# Patient Record
Sex: FEMALE | Race: BLACK OR AFRICAN AMERICAN | Employment: UNEMPLOYED | URBAN - METROPOLITAN AREA
[De-identification: names, ages, dates, MRNs, and addresses within clinical notes are randomized per-mention and may not be internally consistent; named-entity substitution may affect disease eponyms.]

---

## 2017-01-05 NOTE — PROGRESS NOTES
SUBJECTIVE:                                                    Waleska Geronimo is a 36 year old female who presents to clinic today for the following health issues:    Due to language barrier, I've requested an  today via phone    It appears she didn't have an  when she was given the results of her labs. Therefore she was unclear of how or where to schedule a specialist appointment with endocrinology.     Presents today because she would like to be put on birth control. LMP: 1/5/17     Would like to start birth control  No protection currently  Last unprotected intercourse was 4 days ago  Has previously been on the depo shot, would like to continue this.   LMP: 1/5/17  Cycles are 27-28 days  Menstruation is 3-5 days  Last 2 periods have been normal.       She also has a history of depression and was on zoloft but prefers not to be on this and also that it not be on her record.     Problem list and histories reviewed & adjusted, as indicated.  Additional history: as documented    ROS:  C: NEGATIVE for fever, chills, change in weight  E/M: NEGATIVE for ear, mouth and throat problems  R: NEGATIVE for significant cough or SOB  CV: NEGATIVE for chest pain, palpitations or peripheral edema    Patient Active Problem List   Diagnosis     Hyperthyroidism     Past Surgical History   Procedure Laterality Date     No history of surgery         Social History   Substance Use Topics     Smoking status: Never Smoker      Smokeless tobacco: Not on file     Alcohol Use: No     Family History   Problem Relation Age of Onset     Coronary Artery Disease No family hx of      DIABETES No family hx of      CEREBROVASCULAR DISEASE No family hx of      Breast Cancer No family hx of      Colon Cancer No family hx of            Problem list, Medication list, Allergies, and Medical/Social/Surgical histories reviewed in [facility] and updated as appropriate.  Labs reviewed in EPIC    OBJECTIVE:                                   "                  /75 mmHg  Pulse 105  Temp(Src) 99.2  F (37.3  C) (Oral)  Ht 5' 2\" (1.575 m)  Wt 131 lb 9.6 oz (59.693 kg)  BMI 24.06 kg/m2  SpO2 98%  LMP 01/05/2017 Body mass index is 24.06 kg/(m^2).   GENERAL: healthy, alert, well nourished, well hydrated, no distress  EYES: Eyes grossly normal to inspection, extraocular movements - intact, and PERRL  HENT: ear canals- normal; TMs- normal; Nose- normal; Mouth- no ulcers, no lesions  NECK: no tenderness, no adenopathy, no asymmetry, no masses, no stiffness; thyroid- normal to palpation  RESP: lungs clear to auscultation - no rales, no rhonchi, no wheezes  CV: regular rates and rhythm, normal S1 S2, no S3 or S4 and no murmur, no click or rub -  SKIN: no suspicious lesions, no rashes         ASSESSMENT/PLAN:                                                        ICD-10-CM    1. Encounter for initial prescription of injectable contraceptive Z30.013 Beta HCG qual IFA urine     Beta HCG qual IFA urine   2. Hyperthyroidism E05.90      > 25 minutes were spent with the patient and / or family present in education and / or counseling regarding the issues below, including coordination of care.  This represented more than 50% of the time spent interacting with the patient during this visit.     Patient Instructions   Get pregnancy test today  Use condoms for protection and return in 2 weeks for repeat pregnancy test  If this is negative, ok to start Depo shot.      PLEASE CALL TO SCHEDULE AN APPOINTMENT FOR FOLLOW UP ON YOUR THYROID. You can choose any clinic that is convenient for you and is covered by your health insurance.  Your provider has referred you to: [facility]: Endocrinology and Diabetes Clinic - [place] [phone]   [email or web address]  [facility]: [facility] [phone]   [email or web address]  [facility]: Endocrinology Clinic " "[facility] [place] [phone]   [email or web address]  [facility] for Endocrine and Metabolic [facility]  [place] [phone]      Please be aware that coverage of these services is subject to the terms and limitations of your health insurance plan.  Call member services at your health plan with any benefit or coverage questions.      Please bring the following to your appointment:    >>   Any x-rays, CTs or MRIs which have been performed.  Contact the facility where they were done to arrange for  prior to your scheduled appointment.    >>   List of current medications   >>   This referral request   >>   Any documents/labs given to you for this referral          Estimated body mass index is 24.06 kg/(m^2) as calculated from the following:    Height as of this encounter: 5' 2\" (1.575 m).    Weight as of this encounter: 131 lb 9.6 oz (59.693 kg).       Adali Whitley [facility]  [facility]      "

## 2017-01-09 ENCOUNTER — OFFICE VISIT (OUTPATIENT)
Dept: FAMILY MEDICINE | Facility: CLINIC | Age: 37
End: 2017-01-09
Payer: COMMERCIAL

## 2017-01-09 VITALS
HEIGHT: 62 IN | BODY MASS INDEX: 24.22 KG/M2 | TEMPERATURE: 99.2 F | OXYGEN SATURATION: 98 % | SYSTOLIC BLOOD PRESSURE: 113 MMHG | DIASTOLIC BLOOD PRESSURE: 75 MMHG | HEART RATE: 105 BPM | WEIGHT: 131.6 LBS

## 2017-01-09 DIAGNOSIS — E05.90 HYPERTHYROIDISM: ICD-10-CM

## 2017-01-09 DIAGNOSIS — Z30.013 ENCOUNTER FOR INITIAL PRESCRIPTION OF INJECTABLE CONTRACEPTIVE: Primary | ICD-10-CM

## 2017-01-09 LAB — BETA HCG QUAL IFA URINE: NEGATIVE

## 2017-01-09 PROCEDURE — 99214 OFFICE O/P EST MOD 30 MIN: CPT | Performed by: PHYSICIAN ASSISTANT

## 2017-01-09 PROCEDURE — 84703 CHORIONIC GONADOTROPIN ASSAY: CPT | Performed by: PHYSICIAN ASSISTANT

## 2017-01-09 NOTE — MR AVS SNAPSHOT
After Visit Summary   1/9/2017    Waleska Geronimo    MRN: [id number]           Patient Information     Date Of Birth          1980        Visit Information        Provider Department      1/9/2017 2:20 PM Adali Costello PA-C [facility]        Today's Diagnoses     Encounter for initial prescription of injectable contraceptive    -  1     Hyperthyroidism           Care Instructions    Get pregnancy test today  Use condoms for protection and return in 2 weeks for repeat pregnancy test  If this is negative, ok to start Depo shot.      PLEASE CALL TO SCHEDULE AN APPOINTMENT FOR FOLLOW UP ON YOUR THYROID. You can choose any clinic that is convenient for you and is covered by your health insurance.  Your provider has referred you to: [facility]: Endocrinology and Diabetes Clinic [facility] [phone]   [email or web address]/  [facility]: [facility] [phone]   [email or web address]://[email or web address]/[email or web address]/  [facility]: Endocrinology Clinic [facility] [phone]   [email or web address]  [facility] for Endocrine and Metabolic Disorders [facility] [phone]      Please be aware that coverage of these services is subject to the terms and limitations of your health insurance plan.  Call member services at your health plan with any benefit or coverage questions.      Please bring the following to your appointment:    >>   Any x-rays, CTs or MRIs which have been performed.  Contact the facility where they were done to arrange for  prior to your scheduled appointment.    >>   List of current medications   >>   This referral request   >>   Any documents/labs given to you for this referral          Follow-ups after your visit        Future tests that were ordered for you today     Open Future Orders        Priority Expected Expires Ordered    Beta HCG qual  "IFA urine Routine 2017            Who to contact     If you have questions or need follow up information about today's clinic visit or your schedule please contact [facility] directly at [phone].  Normal or non-critical lab and imaging results will be communicated to you by MyChart, letter or phone within 4 business days after the clinic has received the results. If you do not hear from us within 7 days, please contact the clinic through [employer] or phone. If you have a critical or abnormal lab result, we will notify you by phone as soon as possible.  Submit refill requests through "[employer] or call your pharmacy and they will forward the refill request to us. Please allow 3 business days for your refill to be completed.          Additional Information About Your Visit        Camio[employer]utor Assignment Information     "[employer] lets you send messages to your doctor, view your test results, renew your prescriptions, schedule appointments and more. To sign up, go to [email or web address] . Click on \"Log in\" on the left side of the screen, which will take you to the Welcome page. Then click on \"Sign up Now\" on the right side of the page.     You will be asked to enter the access code listed below, as well as some personal information. Please follow the directions to create your username and password.     Your access code is: [email or web address]  Expires: 2017  9:35 AM     Your access code will  in 90 days. If you need help or a new code, please call your [facility] or [phone].        Care EveryWhere ID     This is your Care EveryWhere ID. This could be used by other organizations to access your [place] medical records  [id number]        Your Vitals Were     Pulse Temperature Height BMI (Body Mass Index) Pulse Oximetry Last Period    105 99.2  F (37.3  C) (Oral) 5' 2\" (1.575 m) 24.06 kg/m2 98% 2017       Blood Pressure from Last 3 Encounters:   17 113/75   10/20/16 " 110/75    Weight from Last 3 Encounters:   01/09/17 131 lb 9.6 oz (59.693 kg)   10/20/16 130 lb 1.6 oz (59.013 kg)              We Performed the Following     Beta HCG qual IFA urine        Primary Care Provider    None Specified       No primary provider on file.        Thank you!     Thank you for choosing [facility]  for your care. Our goal is always to provide you with excellent care. Hearing back from our patients is one way we can continue to improve our services. Please take a few minutes to complete the written survey that you may receive in the mail after your visit with us. Thank you!             Your Updated Medication List - Protect others around you: Learn how to safely use, store and throw away your medicines at www.disposemymeds.org.      Notice  As of 1/9/2017  2:43 PM    You have not been prescribed any medications.

## 2017-01-09 NOTE — NURSING NOTE
"Chief Complaint   Patient presents with     Contraception       Initial /75 mmHg  Pulse 105  Temp(Src) 99.2  F (37.3  C) (Oral)  Ht 5' 2\" (1.575 m)  Wt 131 lb 9.6 oz (59.693 kg)  BMI 24.06 kg/m2  SpO2 98%  LMP 01/05/2017 Estimated body mass index is 24.06 kg/(m^2) as calculated from the following:    Height as of this encounter: 5' 2\" (1.575 m).    Weight as of this encounter: 131 lb 9.6 oz (59.693 kg).  BP completed using cuff size: regular    Danica Connor MA      "

## 2017-01-09 NOTE — PATIENT INSTRUCTIONS
Get pregnancy test today  Use condoms for protection and return in 2 weeks for repeat pregnancy test  If this is negative, ok to start Depo shot.      PLEASE CALL TO SCHEDULE AN APPOINTMENT FOR FOLLOW UP ON YOUR THYROID. You can choose any clinic that is convenient for you and is covered by your health insurance.  Your provider has referred you to: [facility]: Endocrinology and Diabetes Clinic [facility] [phone]   [email or web address]/  [facility]: [facility] [place] [place] [phone]   [email or web address]/  [facility]: Endocrinology Clinic [facility] [phone]   [email or web address]  [facility] for Endocrine and Metabolic Disorders [facility] [phone]      Please be aware that coverage of these services is subject to the terms and limitations of your health insurance plan.  Call member services at your health plan with any benefit or coverage questions.      Please bring the following to your appointment:    >>   Any x-rays, CTs or MRIs which have been performed.  Contact the facility where they were done to arrange for  prior to your scheduled appointment.    >>   List of current medications   >>   This referral request   >>   Any documents/labs given to you for this referral

## 2017-01-24 ENCOUNTER — ALLIED HEALTH/NURSE VISIT (OUTPATIENT)
Dept: NURSING | Facility: CLINIC | Age: 37
End: 2017-01-24
Payer: COMMERCIAL

## 2017-01-24 DIAGNOSIS — Z30.013 ENCOUNTER FOR INITIAL PRESCRIPTION OF INJECTABLE CONTRACEPTIVE: Primary | ICD-10-CM

## 2017-01-24 LAB — BETA HCG QUAL IFA URINE: NEGATIVE

## 2017-01-24 PROCEDURE — 96372 THER/PROPH/DIAG INJ SC/IM: CPT

## 2017-01-24 PROCEDURE — 84703 CHORIONIC GONADOTROPIN ASSAY: CPT | Performed by: PHYSICIAN ASSISTANT

## 2017-01-24 PROCEDURE — 99207 ZZC NO CHARGE NURSE ONLY: CPT

## 2017-01-24 RX ORDER — MEDROXYPROGESTERONE ACETATE 150 MG/ML
150 INJECTION, SUSPENSION INTRAMUSCULAR
Qty: 1 ML | Refills: 3 | OUTPATIENT
Start: 2017-01-24 | End: 2019-07-30

## 2017-02-03 ENCOUNTER — TELEPHONE (OUTPATIENT)
Dept: FAMILY MEDICINE | Facility: CLINIC | Age: 37
End: 2017-02-03

## 2017-02-03 NOTE — Clinical Note
[facility]  [address] [place] [address]  [facility] [address]  [phone]      February 3, 2017      Waleska Geronimo  [address]  [facility] [address]          Dear Waleska    In order to ensure we are providing the best quality care, we have reviewed your chart and see that you are due for:    1. Pap Smear    Please call the clinic at your earliest convenience to schedule an appointment.  If you have completed these please contact our office via phone at [phone] or [employer] to update our records.  We would like to know the date (approximately month and year), the result, and ideally where the procedure was performed.    Thank you for trusting us with your health care.      Sincerely,    Care Team for Adali Costello PA-C

## 2017-02-03 NOTE — TELEPHONE ENCOUNTER
Panel Management Review      Patient has the following on her problem list: None      Composite cancer screening  Chart review shows that this patient is due/due soon for the following Pap Smear  Summary:    Patient is due/failing the following:   PAP    Action needed:   Patient needs office visit for Pap smear.    Type of outreach:    Sent letter.    Questions for provider review:    None                                                                                                                                    Danica Connor MA       Chart routed to none.

## 2019-07-30 ENCOUNTER — HOSPITAL ENCOUNTER (EMERGENCY)
Facility: CLINIC | Age: 39
Discharge: HOME | End: 2019-07-30
Attending: EMERGENCY MEDICINE | Admitting: EMERGENCY MEDICINE
Payer: COMMERCIAL

## 2019-07-30 VITALS
SYSTOLIC BLOOD PRESSURE: 110 MMHG | BODY MASS INDEX: 25.64 KG/M2 | OXYGEN SATURATION: 100 % | TEMPERATURE: 98.1 F | DIASTOLIC BLOOD PRESSURE: 74 MMHG | WEIGHT: 140.2 LBS | HEART RATE: 70 BPM

## 2019-07-30 DIAGNOSIS — G44.209 TENSION HEADACHE: ICD-10-CM

## 2019-07-30 LAB
ALBUMIN UR-MCNC: 10 MG/DL
APPEARANCE UR: ABNORMAL
BACTERIA #/AREA URNS HPF: ABNORMAL /HPF
BILIRUB UR QL STRIP: NEGATIVE
COLOR UR AUTO: YELLOW
GLUCOSE UR STRIP-MCNC: NEGATIVE MG/DL
HCG UR QL: NEGATIVE
HGB UR QL STRIP: NEGATIVE
INTERNAL QC OK POCT: YES
KETONES UR STRIP-MCNC: NEGATIVE MG/DL
LEUKOCYTE ESTERASE UR QL STRIP: ABNORMAL
MUCOUS THREADS #/AREA URNS LPF: PRESENT /LPF
NITRATE UR QL: NEGATIVE
PH UR STRIP: 7.5 PH (ref 5–7)
RBC #/AREA URNS AUTO: 3 /HPF (ref 0–2)
SOURCE: ABNORMAL
SP GR UR STRIP: 1.01 (ref 1–1.03)
SQUAMOUS #/AREA URNS AUTO: 8 /HPF (ref 0–1)
UROBILINOGEN UR STRIP-MCNC: NORMAL MG/DL (ref 0–2)
WBC #/AREA URNS AUTO: 2 /HPF (ref 0–5)

## 2019-07-30 PROCEDURE — 25000128 H RX IP 250 OP 636: Performed by: EMERGENCY MEDICINE

## 2019-07-30 PROCEDURE — 99284 EMERGENCY DEPT VISIT MOD MDM: CPT | Mod: 25

## 2019-07-30 PROCEDURE — 96374 THER/PROPH/DIAG INJ IV PUSH: CPT

## 2019-07-30 PROCEDURE — 99284 EMERGENCY DEPT VISIT MOD MDM: CPT | Mod: Z6 | Performed by: EMERGENCY MEDICINE

## 2019-07-30 PROCEDURE — 81001 URINALYSIS AUTO W/SCOPE: CPT | Performed by: EMERGENCY MEDICINE

## 2019-07-30 PROCEDURE — 81025 URINE PREGNANCY TEST: CPT | Performed by: EMERGENCY MEDICINE

## 2019-07-30 PROCEDURE — 96361 HYDRATE IV INFUSION ADD-ON: CPT

## 2019-07-30 PROCEDURE — 96375 TX/PRO/DX INJ NEW DRUG ADDON: CPT

## 2019-07-30 RX ORDER — DIPHENHYDRAMINE HYDROCHLORIDE 50 MG/ML
25 INJECTION INTRAMUSCULAR; INTRAVENOUS ONCE
Status: COMPLETED | OUTPATIENT
Start: 2019-07-30 | End: 2019-07-30

## 2019-07-30 RX ORDER — KETOROLAC TROMETHAMINE 30 MG/ML
30 INJECTION, SOLUTION INTRAMUSCULAR; INTRAVENOUS ONCE
Status: COMPLETED | OUTPATIENT
Start: 2019-07-30 | End: 2019-07-30

## 2019-07-30 RX ORDER — SODIUM CHLORIDE 9 MG/ML
1000 INJECTION, SOLUTION INTRAVENOUS CONTINUOUS
Status: DISCONTINUED | OUTPATIENT
Start: 2019-07-30 | End: 2019-07-30 | Stop reason: HOSPADM

## 2019-07-30 RX ORDER — ONDANSETRON 2 MG/ML
4 INJECTION INTRAMUSCULAR; INTRAVENOUS EVERY 30 MIN PRN
Status: DISCONTINUED | OUTPATIENT
Start: 2019-07-30 | End: 2019-07-30 | Stop reason: HOSPADM

## 2019-07-30 RX ADMIN — ONDANSETRON 4 MG: 2 INJECTION INTRAMUSCULAR; INTRAVENOUS at 12:41

## 2019-07-30 RX ADMIN — DIPHENHYDRAMINE HYDROCHLORIDE 25 MG: 50 INJECTION, SOLUTION INTRAMUSCULAR; INTRAVENOUS at 12:41

## 2019-07-30 RX ADMIN — KETOROLAC TROMETHAMINE 30 MG: 30 INJECTION, SOLUTION INTRAMUSCULAR at 12:42

## 2019-07-30 RX ADMIN — SODIUM CHLORIDE 1000 ML: 9 INJECTION, SOLUTION INTRAVENOUS at 12:40

## 2019-07-30 ASSESSMENT — ENCOUNTER SYMPTOMS
FEVER: 0
NAUSEA: 1
HEADACHES: 1
VOMITING: 1

## 2019-07-30 NOTE — ED PROVIDER NOTES
[facility] EMERGENCY DEPARTMENT ([facility])     July 30, 2019    History     Chief Complaint   Patient presents with     Headache     headache started last night, weak, vomiting     HPI  Waleska Geronimo is a 38 year old female with a history of hyperthyroidsim who presents to the ED for evaluation of a headache that started last night. Patient reports her headache is mostly located on the top of her head. She complains of nausea and vomiting this morning after breakfast. She took an unspecified medication for her headache prior to arrival. She denies vision changes or fever. Patient reports she has no problems ambulating independently. Of note, she has had previous episodes of headaches, but not as severe as this incident. No fever; no neck pain.     PAST MEDICAL HISTORY  Past Medical History:   Diagnosis Date     Enlarged thyroid      PAST SURGICAL HISTORY  Past Surgical History:   Procedure Laterality Date     NO HISTORY OF SURGERY       FAMILY HISTORY  Family History   Problem Relation Age of Onset     Coronary Artery Disease No family hx of      Diabetes No family hx of      Cerebrovascular Disease No family hx of      Breast Cancer No family hx of      Colon Cancer No family hx of      SOCIAL HISTORY  Social History     Tobacco Use     Smoking status: Never Smoker     Smokeless tobacco: Never Used   Substance Use Topics     Alcohol use: No     MEDICATIONS  No current facility-administered medications for this encounter.      No current outpatient medications on file.     ALLERGIES  No Known Allergies      I have reviewed the Medications, Allergies, Past Medical and Surgical History, and Social History in the Epic system.    Review of Systems   Constitutional: Negative for fever.   Eyes: Negative for visual disturbance.   Gastrointestinal: Positive for nausea and vomiting.   Neurological: Positive for headaches.   All other systems reviewed and are negative.      Physical Exam   BP: 134/77  Heart Rate:  65  Temp: 96  F (35.6  C)  Weight: 63.6 kg (140 lb 3.2 oz)  SpO2: 100 %      Physical Exam   Constitutional: She is oriented to person, place, and time. She appears well-developed and well-nourished.   HENT:   Head: Normocephalic and atraumatic.   Eyes: Pupils are equal, round, and reactive to light. Conjunctivae and EOM are normal.   Neck: Normal range of motion.   Cardiovascular: Normal rate, regular rhythm and normal heart sounds.   No murmur heard.  Pulmonary/Chest: Effort normal and breath sounds normal. No stridor. No respiratory distress. She has no wheezes.   Abdominal: Soft. She exhibits no distension. There is no tenderness. There is no rebound.   Musculoskeletal: She exhibits no tenderness.   Neurological: She is alert and oriented to person, place, and time.   Skin: Skin is warm and dry.   Psychiatric: She has a normal mood and affect. Her behavior is normal. Thought content normal.       ED Course        Procedures             Critical Care time:  none         Results for orders placed or performed during the hospital encounter of 07/30/19   UA with Microscopic   Result Value Ref Range    Color Urine Yellow     Appearance Urine Slightly Cloudy     Glucose Urine Negative NEG^Negative mg/dL    Bilirubin Urine Negative NEG^Negative    Ketones Urine Negative NEG^Negative mg/dL    Specific Gravity Urine 1.015 1.003 - 1.035    Blood Urine Negative NEG^Negative    pH Urine 7.5 (H) 5.0 - 7.0 pH    Protein Albumin Urine 10 (A) NEG^Negative mg/dL    Urobilinogen mg/dL Normal 0.0 - 2.0 mg/dL    Nitrite Urine Negative NEG^Negative    Leukocyte Esterase Urine Moderate (A) NEG^Negative    Source Midstream Urine     WBC Urine 2 0 - 5 /HPF    RBC Urine 3 (H) 0 - 2 /HPF    Bacteria Urine Few (A) NEG^Negative /HPF    Squamous Epithelial /HPF Urine 8 (H) 0 - 1 /HPF    Mucous Urine Present (A) NEG^Negative /LPF   hCG qual urine POCT   Result Value Ref Range    HCG Qual Urine Negative neg    Internal QC OK Yes       Medications   0.9% sodium chloride BOLUS (0 mLs Intravenous Stopped 7/30/19 1432)   ketorolac (TORADOL) injection 30 mg (30 mg Intravenous Given 7/30/19 1242)   diphenhydrAMINE (BENADRYL) injection 25 mg (25 mg Intravenous Given 7/30/19 1241)            Labs Ordered and Resulted from Time of ED Arrival Up to the Time of Departure from the ED - No data to display      No results found for this or any previous visit (from the past 24 hour(s)).      Assessments & Plan (with Medical Decision Making)  Patient is a 38-year-old female with a history of prior headaches who presents to the ER due to headache about 12 hours.  Patient states the headache started last night and was persistent today.  Patient here received some IV fluids, nausea medication, and some Toradol and is feeling better.  Her headache is resolved.  Family says that she feels back to normal.  Patient will be given oral challenge, as long as she is able to keep it down will be discharged home with her family members.   This part of the medical record was transcribed by Yari Pereyra,  Medical Scribe, from a dictation done by Robyn Jesus MD.        I have reviewed the nursing notes.    I have reviewed the findings, diagnosis, plan and need for follow up with the patient.       Medication List      There are no discharge medications for this visit.         Final diagnoses:   Tension headache     Yari CASTRO, am serving as a trained medical scribe to document services personally performed by Robyn Jesus MD, based on the provider's statements to me.      Robyn CASTRO MD, was physically present and have reviewed and verified the accuracy of this note documented by Yari Pereyra.     7/30/2019   [facility], EMERGENCY DEPARTMENT     Robyn Jesus MD  07/30/19 4730

## 2019-07-30 NOTE — ED AVS SNAPSHOT
[facility], [place], Emergency Department  [address]  [place] [place] [place] [facility] [place] [place] [address]  Phone:  [phone]  Fax:  [phone]                                    Waleska Geronimo   MRN: [id number]    Department:  [facility], Emergency Department   Date of Visit:  7/30/2019           After Visit Summary Signature Page    I have received my discharge instructions, and my questions have been answered. I have discussed any challenges I see with this plan with the nurse or doctor.    ..........................................................................................................................................  Patient/Patient Representative Signature      ..........................................................................................................................................  Patient Representative Print Name and Relationship to Patient    ..................................................               ................................................  Date                                   Time    ..........................................................................................................................................  Reviewed by Signature/Title    ...................................................              ..............................................  Date                                               Time          22EPIC Rev 08/18

## 2019-07-30 NOTE — ED NOTES
Pt states the HA is on the top of her head and she is photo sensitive to light.  Pt states no known injury and no hx of HA in the past. WEI/CMS intact.

## 2019-07-30 NOTE — LETTER
July 30, 2019      To Whom It May Concern:      Waleska Geronimo was seen in our Emergency Department today, 07/30/19.  I expect her condition to improve over the next 2 days.  She may return to work/school when improved.    Sincerely,        Robyn Jesus MD        
No

## 2019-07-30 NOTE — DISCHARGE INSTRUCTIONS
Take ibuprofen or tylenol as needed if pain reoccurs.     Please make an appointment to follow up with Your Primary Care Provider in 3-5 days even if entirely better.

## 2020-09-26 ENCOUNTER — HOSPITAL ENCOUNTER (EMERGENCY)
Facility: CLINIC | Age: 40
Discharge: HOME | End: 2020-09-26
Attending: FAMILY MEDICINE | Admitting: FAMILY MEDICINE
Payer: COMMERCIAL

## 2020-09-26 VITALS
SYSTOLIC BLOOD PRESSURE: 151 MMHG | WEIGHT: 139.6 LBS | HEART RATE: 83 BPM | TEMPERATURE: 99 F | OXYGEN SATURATION: 100 % | DIASTOLIC BLOOD PRESSURE: 90 MMHG | BODY MASS INDEX: 25.53 KG/M2

## 2020-09-26 DIAGNOSIS — M79.645 PAIN OF FINGER OF LEFT HAND: ICD-10-CM

## 2020-09-26 PROCEDURE — 99282 EMERGENCY DEPT VISIT SF MDM: CPT | Performed by: FAMILY MEDICINE

## 2020-09-26 PROCEDURE — 99282 EMERGENCY DEPT VISIT SF MDM: CPT | Mod: Z6 | Performed by: FAMILY MEDICINE

## 2020-09-26 NOTE — ED AVS SNAPSHOT
[facility], [place], Emergency Department  [address]  [facility] [place] [address]  Phone:  [phone]  Fax:  [phone]                                    Waleska Geronimo   MRN: [id number]    Department:  [facility], Emergency Department   Date of Visit:  9/26/2020           After Visit Summary Signature Page    I have received my discharge instructions, and my questions have been answered. I have discussed any challenges I see with this plan with the nurse or doctor.    ..........................................................................................................................................  Patient/Patient Representative Signature      ..........................................................................................................................................  Patient Representative Print Name and Relationship to Patient    ..................................................               ................................................  Date                                   Time    ..........................................................................................................................................  Reviewed by Signature/Title    ...................................................              ..............................................  Date                                               Time          22EPIC Rev 08/18

## 2020-09-27 NOTE — DISCHARGE INSTRUCTIONS
Thank you for choosing [facility].     Please closely monitor for further symptoms. Return to the Emergency Department if you develop any new or worsening signs or symptoms.    If you received any opiate pain medications or sedatives during your visit, please do not drive for at least 8 hours.     Labs, cultures or final xray interpretations may still need to be reviewed.  We will call you if your plan of care needs to be changed.    Please follow up with your primary care physician or clinic.

## 2020-09-27 NOTE — ED PROVIDER NOTES
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      The history is provided by the patient.     Waleska Geronimo is a 40 year old female with a history of hyperthyroidism who presents to the Emergency Department for evaluation of a left first finger. Patients reports that she was grabbing a paper packaging without gloves on when she reports feeling something bite her on her left 1st finger. The patient complained of diaphoresis and dizziness following the incident. She states that the paper packaging contains food, and that she did not witness any animal during or after the incident. She reports not seeing blood at the site after the incident. She states this occurred at 1600. She denies any pain presently and has no other complaints.     Past Medical History  Past Medical History:   Diagnosis Date     Enlarged thyroid      Past Surgical History:   Procedure Laterality Date     NO HISTORY OF SURGERY       No current outpatient medications on file.    No Known Allergies  Family History  Family History   Problem Relation Age of Onset     Coronary Artery Disease No family hx of      Diabetes No family hx of      Cerebrovascular Disease No family hx of      Breast Cancer No family hx of      Colon Cancer No family hx of      Social History   Social History     Tobacco Use     Smoking status: Never Smoker     Smokeless tobacco: Never Used   Substance Use Topics     Alcohol use: No     Drug use: No      Past medical history, past surgical history, medications, allergies, family history, and social history were reviewed with the patient. No " additional pertinent items.       Review of Systems  ROS: 14 point ROS neg other than the symptoms noted above in the HPI.    Physical Exam   BP: (!) 151/90  Pulse: 83  Temp: 99  F (37.2  C)  Weight: 63.3 kg (139 lb 9.6 oz)  SpO2: 100 %  Physical Exam  Vitals signs and nursing note reviewed.   Constitutional:       General: She is not in acute distress.     Appearance: She is not diaphoretic.   HENT:      Head: Atraumatic.      Mouth/Throat:      Pharynx: No oropharyngeal exudate.   Eyes:      General: No scleral icterus.     Pupils: Pupils are equal, round, and reactive to light.   Cardiovascular:      Heart sounds: Normal heart sounds.   Pulmonary:      Effort: No respiratory distress.      Breath sounds: Normal breath sounds.   Abdominal:      General: Bowel sounds are normal.      Palpations: Abdomen is soft.      Tenderness: There is no abdominal tenderness.   Musculoskeletal:         General: No tenderness.        Hands:    Skin:     General: Skin is warm.      Findings: No rash.         ED Course       7:29 PM  The patient was seen and examined by Cyril Brooks MD in Room ED10.   Procedures                         No results found for any visits on 09/26/20.  Medications - No data to display     Assessments & Plan (with Medical Decision Making)   Patient was an employee of the [employer], was working in the cafeteria lifting some paper products, but she got a sudden sharp pain on the volar tip of the left thumb.  She was concerned she may have been bitten by an insect or animal of some type, however she did not see anything like this and the incident occurred in the [employer] cafeteria, this would appear to be less likely than simply a mechanical injury.  Sickle exam is essentially normal as outlined above.  We will closely monitor the area and herself for any signs of worsening swelling or systemic symptoms, otherwise can use warm soaks or ibuprofen as needed and follow-up as needed.  We discussed the indications  for emergency department return and follow-up.  Stable for discharge.      I have reviewed the nursing notes. I have reviewed the findings, diagnosis, plan and need for follow up with the patient.    New Prescriptions    No medications on file       Final diagnoses:   Pain of finger of left hand   I, Amanda Guidry, am serving as a trained medical scribe to document services personally performed by Cyril Brooks MD, based on the provider's statements to me.     I, Cyril Brooks MD, was physically present and have reviewed and verified the accuracy of this note documented by Amanda Guidry.    --  Cyril Brooks MD  [facility], [place], EMERGENCY DEPARTMENT  9/26/2020     Cyril Brooks MD  09/26/20 2009